# Patient Record
Sex: MALE | Race: WHITE | NOT HISPANIC OR LATINO | Employment: OTHER | URBAN - METROPOLITAN AREA
[De-identification: names, ages, dates, MRNs, and addresses within clinical notes are randomized per-mention and may not be internally consistent; named-entity substitution may affect disease eponyms.]

---

## 2017-04-04 ENCOUNTER — ALLSCRIPTS OFFICE VISIT (OUTPATIENT)
Dept: OTHER | Facility: OTHER | Age: 72
End: 2017-04-04

## 2017-04-25 PROCEDURE — 88305 TISSUE EXAM BY PATHOLOGIST: CPT | Performed by: SPECIALIST

## 2017-04-26 ENCOUNTER — LAB REQUISITION (OUTPATIENT)
Dept: LAB | Facility: HOSPITAL | Age: 72
End: 2017-04-26
Payer: MEDICARE

## 2017-04-26 DIAGNOSIS — D48.5 NEOPLASM OF UNCERTAIN BEHAVIOR OF SKIN: ICD-10-CM

## 2017-10-23 ENCOUNTER — GENERIC CONVERSION - ENCOUNTER (OUTPATIENT)
Dept: OTHER | Facility: OTHER | Age: 72
End: 2017-10-23

## 2018-01-12 VITALS
SYSTOLIC BLOOD PRESSURE: 152 MMHG | TEMPERATURE: 96.5 F | BODY MASS INDEX: 36.19 KG/M2 | WEIGHT: 282 LBS | OXYGEN SATURATION: 98 % | HEART RATE: 73 BPM | RESPIRATION RATE: 18 BRPM | HEIGHT: 74 IN | DIASTOLIC BLOOD PRESSURE: 90 MMHG

## 2018-04-02 RX ORDER — ESOMEPRAZOLE MAGNESIUM 40 MG/1
CAPSULE, DELAYED RELEASE ORAL
COMMUNITY
Start: 2012-03-26

## 2018-04-02 RX ORDER — MULTIVIT-MIN/IRON/FOLIC ACID/K 18-600-40
1 CAPSULE ORAL DAILY
COMMUNITY

## 2018-04-02 RX ORDER — MULTIVIT-MINERALS/FA/LYCOPENE 0.4 MG-6
TABLET ORAL DAILY
COMMUNITY
Start: 2012-03-27

## 2018-04-02 RX ORDER — CHOLECALCIFEROL (VITAMIN D3) 25 MCG
CAPSULE ORAL DAILY
COMMUNITY

## 2018-04-02 RX ORDER — TAMSULOSIN HYDROCHLORIDE 0.4 MG/1
CAPSULE ORAL
Refills: 11 | COMMUNITY
Start: 2018-01-31 | End: 2019-07-05 | Stop reason: HOSPADM

## 2018-04-03 ENCOUNTER — OFFICE VISIT (OUTPATIENT)
Dept: SURGICAL ONCOLOGY | Facility: CLINIC | Age: 73
End: 2018-04-03
Payer: MEDICARE

## 2018-04-03 VITALS
WEIGHT: 278 LBS | SYSTOLIC BLOOD PRESSURE: 152 MMHG | BODY MASS INDEX: 35.68 KG/M2 | DIASTOLIC BLOOD PRESSURE: 92 MMHG | HEIGHT: 74 IN | HEART RATE: 80 BPM | RESPIRATION RATE: 16 BRPM | TEMPERATURE: 97.8 F

## 2018-04-03 DIAGNOSIS — C43.72 MALIGNANT MELANOMA OF LEFT LOWER EXTREMITY (HCC): Primary | ICD-10-CM

## 2018-04-03 PROCEDURE — 99213 OFFICE O/P EST LOW 20 MIN: CPT | Performed by: SURGERY

## 2018-04-03 NOTE — PROGRESS NOTES
Surgical Oncology Follow Up       [address]  [facility] [address] [address] [facility]  1945  [id number]  [id number] [facility]  [facility] [address] [address]  [place] [address] [address]    Chief Complaint   Patient presents with    Results     f/u  annual visit, states notice some moles noted around post surgical incision area  Malignant melanoma of leg ([facility] [address] )    1/29/2008 Initial Diagnosis     Malignant melanoma of leg ([facility] [address] )         2/11/2008 Surgery     Wide excision of left knee with sentinel lymph node biopsy          Diagnosis and Staging: [id number] melanoma February 2008   Treatment History: Wide excision with sentinel lymph node biopsy February 2008   Current Therapy: Observation   Disease Status: JASON   Interval History: Patient returns in followup of his melanoma  He is doing well at this time with no complaints  He denies any abdominal pain, nausea, vomiting, back pain, bone pain, headaches, or cough  He continues to followup with his dermatologist            Review of Systems  Complete ROS Surg Onc:   Complete ROS Surg Onc:   Constitutional: The patient denies new or recent history of general fatigue, no recent weight loss, no change in appetite  Eyes: No complaints of visual problems, no scleral icterus  ENT: no complaints of ear pain, no hoarseness, no difficulty swallowing,  no tinnitus and no new masses in head, oral cavity, or neck  Cardiovascular: No complaints of chest pain, no palpitations, no ankle edema  Respiratory: No complaints of shortness of breath, no cough  Gastrointestinal: No complaints of jaundice, no bloody stools, no pale stools  Genitourinary: No complaints of dysuria, no hematuria, no nocturia, no frequent urination, no urethral discharge     Musculoskeletal: No complaints of weakness, paralysis, joint stiffness or arthralgias  Integumentary: No complaints of rash, no new lesions  Neurological: No complaints of convulsions, no seizures, no dizziness  Hematologic/Lymphatic: No complaints of easy bruising  Endocrine:  No hot or cold intolerance  No polydipsia, polyphagia, or polyuria  Allergy/immunology:  No environmental allergies  No food allergies  Not immunocompromised  Skin:  No pallor or rash  No wound  Patient Active Problem List   Diagnosis    Arthritis    Malignant melanoma of leg ([facility] [address] )     No past medical history on file  No past surgical history on file  No family history on file  Social History     Social History    Marital status: Single     Spouse name: N/A    Number of children: N/A    Years of education: N/A     Occupational History    Not on file  Social History Main Topics    Smoking status: Not on file    Smokeless tobacco: Not on file    Alcohol use Not on file    Drug use: Unknown    Sexual activity: Not on file     Other Topics Concern    Not on file     Social History Narrative    No narrative on file       Current Outpatient Prescriptions:     esomeprazole (NEXIUM) 40 MG capsule, Take by mouth, Disp: , Rfl:     Multiple Vitamins-Minerals (PX MENS MULTIVITAMINS) TABS, Take by mouth daily, Disp: , Rfl:     Ascorbic Acid (VITAMIN C) 500 MG CAPS, Take 1 tablet by mouth daily, Disp: , Rfl:     aspirin 81 MG tablet, Take 1 tablet by mouth daily, Disp: , Rfl:     Cholecalciferol (VITAMIN D-3) 1000 units CAPS, Take by mouth daily, Disp: , Rfl:     Milk Thistle 175 MG CAPS, Take by mouth, Disp: , Rfl:     tamsulosin (FLOMAX) 0 4 mg, TAKE 1 CAPSULE(S) EVERY DAY BY ORAL ROUTE , Disp: , Rfl: 11  No Known Allergies  Vitals:    04/03/18 0831   BP: 152/92   Pulse: 80   Resp: 16   Temp: 97 8 °F (36 6 °C)       Physical Exam  Constitutional: General appearance: The Patient is well-developed and well-nourished who appears the stated age in no acute distress   Patient is pleasant and talkative  HEENT:  Normocephalic  Sclerae are anicteric  Mucous membranes are moist  Neck is supple without adenopathy  No JVD  Chest: The lungs are clear to auscultation  Cardiac: Heart is regular rate  Abdomen: Abdomen is soft, non-tender, non-distended and without masses  Extremities: There is no clubbing or cyanosis  There is no edema  Symmetric  Neuro: Grossly nonfocal  Gait is normal      Lymphatic: No evidence of cervical adenopathy bilaterally  No evidence of axillary adenopathy bilaterally  No evidence of inguinal adenopathy bilaterally  Skin: Warm, anicteric    wide excision site has healed well  No evidence of local recurrence or in-transit disease  There are 3 moles near the superior aspect of his scar  These have been stable for 10 years  Psych:  Patient is pleasant and talkative  Pathology:      Labs:      Imaging  No results found  I reviewed the above laboratory and imaging data  Discussion/Summary:   75-year-old male status post wide excision and sentinel lymph node biopsy for a [id number] melanoma  Is clinically JASON at 10 years  He will continue to followup with his dermatologist   At this time, his risk of recurrence is low  I discussed he can just follow up with his dermatologist and CS as needed  I discussed if he has any new, persistent, or unexplained symptoms to contact us and directed imaging may be performed  He is agreeable to this  All his questions were answered

## 2018-08-30 ENCOUNTER — APPOINTMENT (OUTPATIENT)
Dept: RADIOLOGY | Facility: CLINIC | Age: 73
End: 2018-08-30
Payer: MEDICARE

## 2018-08-30 VITALS
HEIGHT: 74 IN | WEIGHT: 278.8 LBS | DIASTOLIC BLOOD PRESSURE: 64 MMHG | BODY MASS INDEX: 35.78 KG/M2 | HEART RATE: 70 BPM | SYSTOLIC BLOOD PRESSURE: 160 MMHG

## 2018-08-30 DIAGNOSIS — M17.0 BILATERAL PRIMARY OSTEOARTHRITIS OF KNEE: Primary | ICD-10-CM

## 2018-08-30 DIAGNOSIS — M25.561 RIGHT KNEE PAIN, UNSPECIFIED CHRONICITY: ICD-10-CM

## 2018-08-30 DIAGNOSIS — M79.661 RIGHT CALF PAIN: ICD-10-CM

## 2018-08-30 PROBLEM — C43.70: Status: ACTIVE | Noted: 2018-08-30

## 2018-08-30 PROCEDURE — 73562 X-RAY EXAM OF KNEE 3: CPT

## 2018-08-30 PROCEDURE — 99204 OFFICE O/P NEW MOD 45 MIN: CPT | Performed by: ORTHOPAEDIC SURGERY

## 2018-08-30 PROCEDURE — 20610 DRAIN/INJ JOINT/BURSA W/O US: CPT | Performed by: ORTHOPAEDIC SURGERY

## 2018-08-30 RX ORDER — TRIAMCINOLONE ACETONIDE 40 MG/ML
40 INJECTION, SUSPENSION INTRA-ARTICULAR; INTRAMUSCULAR
Status: COMPLETED | OUTPATIENT
Start: 2018-08-30 | End: 2018-08-30

## 2018-08-30 RX ORDER — BUPIVACAINE HYDROCHLORIDE 5 MG/ML
6 INJECTION, SOLUTION EPIDURAL; INTRACAUDAL
Status: COMPLETED | OUTPATIENT
Start: 2018-08-30 | End: 2018-08-30

## 2018-08-30 RX ADMIN — TRIAMCINOLONE ACETONIDE 40 MG: 40 INJECTION, SUSPENSION INTRA-ARTICULAR; INTRAMUSCULAR at 09:09

## 2018-08-30 RX ADMIN — BUPIVACAINE HYDROCHLORIDE 6 ML: 5 INJECTION, SOLUTION EPIDURAL; INTRACAUDAL at 09:09

## 2018-08-30 NOTE — PROGRESS NOTES
Assessment/Plan:  1  Bilateral primary osteoarthritis of knee  Large joint arthrocentesis   2  Right calf pain     3  Right knee pain, unspecified chronicity  XR knee 3 vw right non injury    Large joint arthrocentesis     Jaja Fuentes is a pleasant 74year old gentleman with activity related right knee and calf pain due to his severe underlying osteoarthritis, primarily of the patellofemoral compartment  He had two recent negative ultrasounds, so there is no concern for DVT  His calf pain is likely from overuse or limping due to his knee osteoarthritis  Since he has done well after cortisone injections 2 and 4 years ago, respectively, he consented to a repeat injection today, which he tolerated well without any complication  He does understand that he may need a total knee arthroplasty at some point, but would like to delay this as long as possible as he is still working  He can return in 3-4 months pending the efficacy of today's injection  All of his questions were addressed  Large joint arthrocentesis  Date/Time: 8/30/2018 9:09 AM  Consent given by: patient  Site marked: site marked  Timeout: Immediately prior to procedure a time out was called to verify the correct patient, procedure, equipment, support staff and site/side marked as required   Supporting Documentation  Indications: pain   Procedure Details  Location: knee - R knee  Preparation: Patient was prepped and draped in the usual sterile fashion  Needle size: 20 G  Ultrasound guidance: no  Approach: anterolateral  Medications administered: 6 mL bupivacaine (PF) 0 5 %; 40 mg triamcinolone acetonide 40 mg/mL    Patient tolerance: patient tolerated the procedure well with no immediate complications  Dressing:  Sterile dressing applied      After the risks and benefits of the right knee injection were explained, and verbal consent was obtained for the injection    The right knee was prepped using aseptic technique using isopropyl alcohol and betadine solution  The right knee was successfully injected with 6cc of 0 5% marcaine without epinephrine and 2cc of Kenalog 40 suspension using a 20 gauge needle  After the injection, hemostasis was achieved, and a dressing was applied  Post-injection icing and activity modification instructions were provided  The patient tolerated the procedure well  Subjective: Right knee and calf pain    Patient ID: Katerina Agarwal is a 74 y o  male  Orparag Huerta is a pleasant 74year old gentleman presenting for evaluation of right knee pain that has been intermittent for years  He reports a history of injury in the service 50 years ago in which he was casted and received physical therapy  He has not had any surgery on the knee  He developed discomfort with ADLs approximately 4 years ago, and was seen by an orthopedist in [place]  He was given a cortisone injection and did well for 2 years  He then underwent another injection 2 years ago, which helped significantly  He denies any locking, buckling, or giving way  He has not tried NSAIDs, tylenol, a brace, or assistive devices to help with his current pain  At it's worst, it rates 10/10  He has had sharp, stabbing calf pain over the past 3 weeks as well, but US ordered by his PCP have twice ruled out a DVT  His knee pain is primarily in the anterior and medial aspect  He denies parasthesias in the lower extremities  Review of Systems   Constitutional: Negative  HENT: Negative  Eyes: Negative  Respiratory: Negative  Cardiovascular: Negative  Gastrointestinal: Negative  Endocrine: Positive for polyuria  Genitourinary: Negative  Musculoskeletal: Positive for arthralgias and myalgias  Skin: Negative  Allergic/Immunologic: Negative  Neurological: Negative  Hematological: Negative  Psychiatric/Behavioral: Negative  Past Medical History:   Diagnosis Date    Cancer [facility])     Osteoarthritis        History reviewed   No pertinent surgical history  Family History   Problem Relation Age of Onset    No Known Problems Mother     No Known Problems Father     No Known Problems Sister     No Known Problems Brother     No Known Problems Maternal Aunt     No Known Problems Maternal Uncle     No Known Problems Paternal Aunt     No Known Problems Paternal Uncle     No Known Problems Maternal Grandmother     No Known Problems Maternal Grandfather     No Known Problems Paternal Grandmother     No Known Problems Paternal Grandfather     ADD / ADHD Neg Hx     Anesthesia problems Neg Hx     Cancer Neg Hx     Clotting disorder Neg Hx     Collagen disease Neg Hx     Diabetes Neg Hx     Dislocations Neg Hx     Learning disabilities Neg Hx     Neurological problems Neg Hx     Osteoporosis Neg Hx     Rheumatologic disease Neg Hx     Scoliosis Neg Hx     Vascular Disease Neg Hx        Social History     Occupational History    Not on file  Social History Main Topics    Smoking status: Never Smoker    Smokeless tobacco: Never Used    Alcohol use 1 2 oz/week     2 Glasses of wine per week      Comment: occasionally     Drug use: No    Sexual activity: Not on file         Current Outpatient Prescriptions:     Ascorbic Acid (VITAMIN C) 500 MG CAPS, Take 1 tablet by mouth daily, Disp: , Rfl:     aspirin 81 MG tablet, Take 1 tablet by mouth daily, Disp: , Rfl:     Cholecalciferol (VITAMIN D-3) 1000 units CAPS, Take by mouth daily, Disp: , Rfl:     esomeprazole (NEXIUM) 40 MG capsule, Take by mouth, Disp: , Rfl:     Milk Thistle 175 MG CAPS, Take by mouth, Disp: , Rfl:     Multiple Vitamins-Minerals (PX MENS MULTIVITAMINS) TABS, Take by mouth daily, Disp: , Rfl:     tamsulosin (FLOMAX) 0 4 mg, TAKE 1 CAPSULE(S) EVERY DAY BY ORAL ROUTE , Disp: , Rfl: 11    No Known Allergies    Objective:  Vitals:    08/30/18 0823   BP: 160/64   Pulse: 70       Body mass index is 35 8 kg/m²      Right Knee Exam     Tenderness   The patient is experiencing no tenderness  Range of Motion   Extension: 5   Flexion: 120     Muscle Strength     The patient has normal right knee strength  Tests   Terra:  Medial - negative Lateral - negative  Lachman:  Anterior - negative      Drawer:       Anterior - negative      Varus: negative  Valgus: negative  Patellar Apprehension: negative    Other   Erythema: absent  Scars: absent  Sensation: normal  Pulse: present  Swelling: mild  Other tests: no effusion present    Comments:  Crepitus with PROM  Negative PFG  Negative Armando  No significant tenderness with calf compression  Ambulates slowly with slightly antalgic gait on the right  Normal sensation L2-S1            Observations     Right Knee   Negative for effusion  Physical Exam   Constitutional: He is oriented to person, place, and time  He appears well-developed and well-nourished  Body mass index is 35 8 kg/m²  HENT:   Head: Normocephalic and atraumatic  Eyes: EOM are normal    Bruise around right eye   Neck: Normal range of motion  Cardiovascular: Intact distal pulses  Pulmonary/Chest: Effort normal    Musculoskeletal:        Right knee: He exhibits no effusion  See ortho exam   Neurological: He is alert and oriented to person, place, and time  Skin: Skin is warm and dry  Psychiatric: He has a normal mood and affect  His behavior is normal  Judgment and thought content normal        I have personally reviewed pertinent films in PACS of his weight bearing knees which show tricompartmental degenerative changes, most significant in the patellofemoral compartment with endstage bone on bone appearance  There is narrowing of the medial and lateral compartments with small osteophytes  No acute fracture, dislocation, or loose body

## 2019-07-05 ENCOUNTER — OFFICE VISIT (OUTPATIENT)
Dept: OBGYN CLINIC | Facility: CLINIC | Age: 74
End: 2019-07-05
Payer: MEDICARE

## 2019-07-05 VITALS
SYSTOLIC BLOOD PRESSURE: 149 MMHG | HEART RATE: 79 BPM | DIASTOLIC BLOOD PRESSURE: 84 MMHG | WEIGHT: 271.6 LBS | HEIGHT: 74 IN | BODY MASS INDEX: 34.86 KG/M2

## 2019-07-05 DIAGNOSIS — M17.0 BILATERAL PRIMARY OSTEOARTHRITIS OF KNEE: Primary | ICD-10-CM

## 2019-07-05 DIAGNOSIS — G89.29 CHRONIC PAIN OF RIGHT KNEE: ICD-10-CM

## 2019-07-05 DIAGNOSIS — M25.561 CHRONIC PAIN OF RIGHT KNEE: ICD-10-CM

## 2019-07-05 PROCEDURE — 20610 DRAIN/INJ JOINT/BURSA W/O US: CPT | Performed by: ORTHOPAEDIC SURGERY

## 2019-07-05 PROCEDURE — 99213 OFFICE O/P EST LOW 20 MIN: CPT | Performed by: ORTHOPAEDIC SURGERY

## 2019-07-05 RX ORDER — TRIAMCINOLONE ACETONIDE 40 MG/ML
80 INJECTION, SUSPENSION INTRA-ARTICULAR; INTRAMUSCULAR
Status: COMPLETED | OUTPATIENT
Start: 2019-07-05 | End: 2019-07-05

## 2019-07-05 RX ORDER — BUPIVACAINE HYDROCHLORIDE 5 MG/ML
6 INJECTION, SOLUTION EPIDURAL; INTRACAUDAL
Status: COMPLETED | OUTPATIENT
Start: 2019-07-05 | End: 2019-07-05

## 2019-07-05 RX ADMIN — BUPIVACAINE HYDROCHLORIDE 6 ML: 5 INJECTION, SOLUTION EPIDURAL; INTRACAUDAL at 14:27

## 2019-07-05 RX ADMIN — TRIAMCINOLONE ACETONIDE 80 MG: 40 INJECTION, SUSPENSION INTRA-ARTICULAR; INTRAMUSCULAR at 14:27

## 2019-07-05 NOTE — PROGRESS NOTES
Assessment/Plan:  1  Bilateral primary osteoarthritis of knee  Large joint arthrocentesis: R knee   2  Chronic pain of right knee  Large joint arthrocentesis: R knee     Ashley Lin is a pleasant 38-year-old gentleman presenting today for follow-up of his activity related right knee pain due to his significant underlying osteoarthritis  He has been doing well with periodic cortisone injections, his most recent one 10 months ago  He is not yet ready to pursue total knee arthroplasty  His current pain complaints are consistent with a flare of his osteoarthritis  He consented to and underwent a right knee cortisone injection as detailed below without difficulty or complication  Post injection instructions were provided  We will plan to see him back in 3-4 months or as needed pending the efficacy of today's injection  All of his questions were addressed    Large joint arthrocentesis: R knee  Date/Time: 7/5/2019 2:27 PM  Consent given by: patient  Site marked: site marked  Timeout: Immediately prior to procedure a time out was called to verify the correct patient, procedure, equipment, support staff and site/side marked as required   Supporting Documentation  Indications: pain and joint swelling   Procedure Details  Location: knee - R knee  Preparation: Patient was prepped and draped in the usual sterile fashion  Needle size: 18 G  Ultrasound guidance: no  Approach: lateral  Medications administered: 6 mL bupivacaine (PF) 0 5 %; 80 mg triamcinolone acetonide 40 mg/mL    Aspirate amount: 0 mL    Patient tolerance: patient tolerated the procedure well with no immediate complications  Dressing:  Sterile dressing applied          Subjective: Right knee follow up    Patient ID: Marsh Koyanagi is a 66 y o  male  Ashley Lin is a pleasant 66year old gentleman presenting today for follow-up of his activity related right knee pain with known severe underlying osteoarthritis    He underwent a cortisone injection approximately 10 months ago and did well up until this morning  He walks approximately 2 miles for [employer] last night, and woke up in the middle the night with significant medial-sided right knee pain  He denies any injury last evening  He denies any mechanical symptoms today  He is interested in repeat cortisone injection if possible  Review of Systems   Constitutional: Positive for activity change  HENT: Negative  Eyes: Negative  Respiratory: Negative  Cardiovascular: Negative  Gastrointestinal: Negative  Endocrine: Positive for polyuria  Genitourinary: Negative  Musculoskeletal: Positive for arthralgias  Skin: Negative  Allergic/Immunologic: Negative  Neurological: Negative  Hematological: Negative  Psychiatric/Behavioral: Negative  Past Medical History:   Diagnosis Date    Cancer [facility])     Osteoarthritis        History reviewed  No pertinent surgical history      Family History   Problem Relation Age of Onset    No Known Problems Mother     No Known Problems Father     No Known Problems Sister     No Known Problems Brother     No Known Problems Maternal Aunt     No Known Problems Maternal Uncle     No Known Problems Paternal Aunt     No Known Problems Paternal Uncle     No Known Problems Maternal Grandmother     No Known Problems Maternal Grandfather     No Known Problems Paternal Grandmother     No Known Problems Paternal Grandfather     ADD / ADHD Neg Hx     Anesthesia problems Neg Hx     Cancer Neg Hx     Clotting disorder Neg Hx     Collagen disease Neg Hx     Diabetes Neg Hx     Dislocations Neg Hx     Learning disabilities Neg Hx     Neurological problems Neg Hx     Osteoporosis Neg Hx     Rheumatologic disease Neg Hx     Scoliosis Neg Hx     Vascular Disease Neg Hx        Social History     Occupational History    Not on file   Tobacco Use    Smoking status: Never Smoker    Smokeless tobacco: Never Used   Substance and Sexual Activity    Alcohol use: Yes     Alcohol/week: 2 0 standard drinks     Types: 2 Glasses of wine per week     Comment: occasionally     Drug use: No    Sexual activity: Not on file         Current Outpatient Medications:     Ascorbic Acid (VITAMIN C) 500 MG CAPS, Take 1 tablet by mouth daily, Disp: , Rfl:     Cholecalciferol (VITAMIN D-3) 1000 units CAPS, Take by mouth daily, Disp: , Rfl:     esomeprazole (NEXIUM) 40 MG capsule, Take by mouth, Disp: , Rfl:     Milk Thistle 175 MG CAPS, Take by mouth, Disp: , Rfl:     Multiple Vitamins-Minerals (PX MENS MULTIVITAMINS) TABS, Take by mouth daily, Disp: , Rfl:     aspirin 81 MG tablet, Take 1 tablet by mouth daily, Disp: , Rfl:     No Known Allergies    Objective:  Vitals:    07/05/19 1412   BP: 149/84   Pulse: 79       Body mass index is 34 87 kg/m²  Right Knee Exam     Muscle Strength   The patient has normal right knee strength  Tenderness   The patient is experiencing tenderness in the medial retinaculum, MCL and medial joint line  Range of Motion   Extension:  10 abnormal   Flexion:  120 normal     Tests   Terra:  Medial - negative Lateral - negative  Varus: negative Valgus: negative  Drawer:  Anterior - negative    Posterior - negative  Patellar apprehension: negative    Other   Erythema: absent  Scars: absent  Sensation: normal  Pulse: present  Swelling: mild  Effusion: no effusion present    Comments:  Crepitus with PROM  Negative PFG  Negative Armando  No significant tenderness with calf compression  Ambulates slowly with slightly antalgic gait on the right  Normal sensation L2-S1            Observations     Right Knee   Negative for effusion  Physical Exam   Constitutional: He is oriented to person, place, and time  He appears well-developed and well-nourished  Body mass index is 34 87 kg/m²  HENT:   Head: Normocephalic and atraumatic  Eyes: EOM are normal    Neck: Normal range of motion  Cardiovascular: Intact distal pulses  Pulmonary/Chest: Effort normal    Musculoskeletal:        Right knee: He exhibits no effusion  See ortho exam   Neurological: He is alert and oriented to person, place, and time  Skin: Skin is warm and dry  Psychiatric: He has a normal mood and affect  His behavior is normal  Judgment and thought content normal    Nursing note and vitals reviewed

## 2021-02-13 ENCOUNTER — IMMUNIZATIONS (OUTPATIENT)
Dept: FAMILY MEDICINE CLINIC | Facility: HOSPITAL | Age: 76
End: 2021-02-13

## 2021-02-13 DIAGNOSIS — Z23 ENCOUNTER FOR IMMUNIZATION: Primary | ICD-10-CM

## 2021-02-13 PROCEDURE — 91300 SARS-COV-2 / COVID-19 MRNA VACCINE (PFIZER-BIONTECH) 30 MCG: CPT

## 2021-02-13 PROCEDURE — 0001A SARS-COV-2 / COVID-19 MRNA VACCINE (PFIZER-BIONTECH) 30 MCG: CPT

## 2021-03-05 ENCOUNTER — IMMUNIZATIONS (OUTPATIENT)
Dept: FAMILY MEDICINE CLINIC | Facility: HOSPITAL | Age: 76
End: 2021-03-05

## 2021-03-05 DIAGNOSIS — Z23 ENCOUNTER FOR IMMUNIZATION: Primary | ICD-10-CM

## 2021-03-05 PROCEDURE — 0002A SARS-COV-2 / COVID-19 MRNA VACCINE (PFIZER-BIONTECH) 30 MCG: CPT

## 2021-03-05 PROCEDURE — 91300 SARS-COV-2 / COVID-19 MRNA VACCINE (PFIZER-BIONTECH) 30 MCG: CPT

## 2023-12-07 ENCOUNTER — HOSPITAL ENCOUNTER (EMERGENCY)
Facility: HOSPITAL | Age: 78
Discharge: HOME | End: 2023-12-07
Attending: EMERGENCY MEDICINE
Payer: MEDICARE

## 2023-12-07 ENCOUNTER — APPOINTMENT (EMERGENCY)
Dept: CT IMAGING | Facility: HOSPITAL | Age: 78
End: 2023-12-07
Payer: MEDICARE

## 2023-12-07 VITALS
TEMPERATURE: 98.8 F | HEART RATE: 67 BPM | OXYGEN SATURATION: 98 % | WEIGHT: 270.73 LBS | BODY MASS INDEX: 34.76 KG/M2 | RESPIRATION RATE: 18 BRPM | DIASTOLIC BLOOD PRESSURE: 74 MMHG | SYSTOLIC BLOOD PRESSURE: 163 MMHG

## 2023-12-07 DIAGNOSIS — R10.9 ABDOMINAL PAIN: Primary | ICD-10-CM

## 2023-12-07 LAB
ALBUMIN SERPL BCP-MCNC: 4.4 G/DL (ref 3.5–5)
ALP SERPL-CCNC: 52 U/L (ref 34–104)
ALT SERPL W P-5'-P-CCNC: 36 U/L (ref 7–52)
ANION GAP SERPL CALCULATED.3IONS-SCNC: 9 MMOL/L
AST SERPL W P-5'-P-CCNC: 35 U/L (ref 13–39)
BASOPHILS # BLD AUTO: 0.06 THOUSANDS/ÂΜL (ref 0–0.1)
BASOPHILS NFR BLD AUTO: 1 % (ref 0–1)
BILIRUB SERPL-MCNC: 0.51 MG/DL (ref 0.2–1)
BILIRUB UR QL STRIP: NEGATIVE
BUN SERPL-MCNC: 16 MG/DL (ref 5–25)
CALCIUM SERPL-MCNC: 9.1 MG/DL (ref 8.4–10.2)
CHLORIDE SERPL-SCNC: 107 MMOL/L (ref 96–108)
CLARITY UR: CLEAR
CO2 SERPL-SCNC: 22 MMOL/L (ref 21–32)
COLOR UR: NORMAL
CREAT SERPL-MCNC: 0.84 MG/DL (ref 0.6–1.3)
EOSINOPHIL # BLD AUTO: 0.41 THOUSAND/ÂΜL (ref 0–0.61)
EOSINOPHIL NFR BLD AUTO: 5 % (ref 0–6)
ERYTHROCYTE [DISTWIDTH] IN BLOOD BY AUTOMATED COUNT: 14.3 % (ref 11.6–15.1)
GFR SERPL CREATININE-BSD FRML MDRD: 82 ML/MIN/1.73SQ M
GLUCOSE SERPL-MCNC: 100 MG/DL (ref 65–140)
GLUCOSE UR STRIP-MCNC: NEGATIVE MG/DL
HCT VFR BLD AUTO: 45.6 % (ref 36.5–49.3)
HGB BLD-MCNC: 14.7 G/DL (ref 12–17)
HGB UR QL STRIP.AUTO: NEGATIVE
IMM GRANULOCYTES # BLD AUTO: 0.02 THOUSAND/UL (ref 0–0.2)
IMM GRANULOCYTES NFR BLD AUTO: 0 % (ref 0–2)
KETONES UR STRIP-MCNC: NEGATIVE MG/DL
LEUKOCYTE ESTERASE UR QL STRIP: NEGATIVE
LIPASE SERPL-CCNC: 10 U/L (ref 11–82)
LYMPHOCYTES # BLD AUTO: 2.37 THOUSANDS/ÂΜL (ref 0.6–4.47)
LYMPHOCYTES NFR BLD AUTO: 26 % (ref 14–44)
MCH RBC QN AUTO: 27.1 PG (ref 26.8–34.3)
MCHC RBC AUTO-ENTMCNC: 32.2 G/DL (ref 31.4–37.4)
MCV RBC AUTO: 84 FL (ref 82–98)
MONOCYTES # BLD AUTO: 0.89 THOUSAND/ÂΜL (ref 0.17–1.22)
MONOCYTES NFR BLD AUTO: 10 % (ref 4–12)
NEUTROPHILS # BLD AUTO: 5.22 THOUSANDS/ÂΜL (ref 1.85–7.62)
NEUTS SEG NFR BLD AUTO: 58 % (ref 43–75)
NITRITE UR QL STRIP: NEGATIVE
NRBC BLD AUTO-RTO: 0 /100 WBCS
PH UR STRIP.AUTO: 5 [PH]
PLATELET # BLD AUTO: 192 THOUSANDS/UL (ref 149–390)
PMV BLD AUTO: 10.6 FL (ref 8.9–12.7)
POTASSIUM SERPL-SCNC: 4.3 MMOL/L (ref 3.5–5.3)
PROT SERPL-MCNC: 7.6 G/DL (ref 6.4–8.4)
PROT UR STRIP-MCNC: NEGATIVE MG/DL
RBC # BLD AUTO: 5.43 MILLION/UL (ref 3.88–5.62)
SODIUM SERPL-SCNC: 138 MMOL/L (ref 135–147)
SP GR UR STRIP.AUTO: 1.02 (ref 1–1.03)
UROBILINOGEN UR STRIP-ACNC: <2 MG/DL
WBC # BLD AUTO: 8.97 THOUSAND/UL (ref 4.31–10.16)

## 2023-12-07 PROCEDURE — 74177 CT ABD & PELVIS W/CONTRAST: CPT

## 2023-12-07 PROCEDURE — 85025 COMPLETE CBC W/AUTO DIFF WBC: CPT | Performed by: EMERGENCY MEDICINE

## 2023-12-07 PROCEDURE — 99285 EMERGENCY DEPT VISIT HI MDM: CPT | Performed by: EMERGENCY MEDICINE

## 2023-12-07 PROCEDURE — G1004 CDSM NDSC: HCPCS

## 2023-12-07 PROCEDURE — 36415 COLL VENOUS BLD VENIPUNCTURE: CPT | Performed by: EMERGENCY MEDICINE

## 2023-12-07 PROCEDURE — 99284 EMERGENCY DEPT VISIT MOD MDM: CPT

## 2023-12-07 PROCEDURE — 80053 COMPREHEN METABOLIC PANEL: CPT | Performed by: EMERGENCY MEDICINE

## 2023-12-07 PROCEDURE — 81003 URINALYSIS AUTO W/O SCOPE: CPT | Performed by: EMERGENCY MEDICINE

## 2023-12-07 PROCEDURE — 83690 ASSAY OF LIPASE: CPT | Performed by: EMERGENCY MEDICINE

## 2023-12-07 PROCEDURE — 96374 THER/PROPH/DIAG INJ IV PUSH: CPT

## 2023-12-07 RX ORDER — ONDANSETRON 2 MG/ML
4 INJECTION INTRAMUSCULAR; INTRAVENOUS ONCE
Status: DISCONTINUED | OUTPATIENT
Start: 2023-12-07 | End: 2023-12-07 | Stop reason: HOSPADM

## 2023-12-07 RX ORDER — KETOROLAC TROMETHAMINE 30 MG/ML
15 INJECTION, SOLUTION INTRAMUSCULAR; INTRAVENOUS ONCE
Status: COMPLETED | OUTPATIENT
Start: 2023-12-07 | End: 2023-12-07

## 2023-12-07 RX ADMIN — IOHEXOL 100 ML: 350 INJECTION, SOLUTION INTRAVENOUS at 13:06

## 2023-12-07 RX ADMIN — KETOROLAC TROMETHAMINE 15 MG: 30 INJECTION, SOLUTION INTRAMUSCULAR; INTRAVENOUS at 14:06

## 2023-12-07 NOTE — ED PROVIDER NOTES
History  Chief Complaint   Patient presents with    Abdominal Pain     Patient reports RLQ pain that started a few months ago. Denies N/V/D     49-year-old male comes in for evaluation of right lower quadrant abdominal pain. Patient states it has been going on for approximately 3 to 4 months. Notices it more when he leans forward or when he is driving. Patient denies any nausea vomiting or diarrhea. No fevers. Denies any hematuria. No back pain. History provided by:  Patient   used: No    Abdominal Pain  Pain location:  RLQ  Pain quality: gnawing    Pain radiates to:  Does not radiate  Pain severity:  Moderate  Onset quality:  Gradual  Duration:  12 weeks  Timing:  Constant  Progression:  Waxing and waning  Chronicity:  New  Context: not alcohol use, not previous surgeries, not suspicious food intake and not trauma    Ineffective treatments:  None tried  Associated symptoms: no anorexia, no chest pain, no cough, no diarrhea, no fever, no hematuria and no vomiting    Risk factors: being elderly and obesity    Risk factors: no alcohol abuse, no NSAID use and not pregnant        Prior to Admission Medications   Prescriptions Last Dose Informant Patient Reported? Taking? Ascorbic Acid (VITAMIN C) 500 MG CAPS   Yes No   Sig: Take 1 tablet by mouth daily   Cholecalciferol (VITAMIN D-3) 1000 units CAPS   Yes No   Sig: Take by mouth daily   Milk Thistle 175 MG CAPS   Yes No   Sig: Take by mouth   Multiple Vitamins-Minerals (PX MENS MULTIVITAMINS) TABS   Yes No   Sig: Take by mouth daily   aspirin 81 MG tablet   Yes No   Sig: Take 1 tablet by mouth daily   esomeprazole (NEXIUM) 40 MG capsule   Yes No   Sig: Take by mouth      Facility-Administered Medications: None       Past Medical History:   Diagnosis Date    Cancer ([address])     Osteoarthritis        History reviewed. No pertinent surgical history.     Family History   Problem Relation Age of Onset    No Known Problems Mother     No Known Problems Father     No Known Problems Sister     No Known Problems Brother     No Known Problems Maternal Aunt     No Known Problems Maternal Uncle     No Known Problems Paternal Aunt     No Known Problems Paternal Uncle     No Known Problems Maternal Grandmother     No Known Problems Maternal Grandfather     No Known Problems Paternal Grandmother     No Known Problems Paternal Grandfather     ADD / ADHD Neg Hx     Anesthesia problems Neg Hx     Cancer Neg Hx     Clotting disorder Neg Hx     Collagen disease Neg Hx     Diabetes Neg Hx     Dislocations Neg Hx     Learning disabilities Neg Hx     Neurological problems Neg Hx     Osteoporosis Neg Hx     Rheumatologic disease Neg Hx     Scoliosis Neg Hx     Vascular Disease Neg Hx      I have reviewed and agree with the history as documented. E-Cigarette/Vaping     E-Cigarette/Vaping Substances     Social History     Tobacco Use    Smoking status: Never    Smokeless tobacco: Never   Substance Use Topics    Alcohol use: Yes     Alcohol/week: 2.0 standard drinks of alcohol     Types: 2 Glasses of wine per week     Comment: occasionally     Drug use: No       Review of Systems   Constitutional:  Negative for activity change, appetite change and fever. HENT:  Negative for congestion and facial swelling. Eyes:  Negative for discharge and redness. Respiratory:  Negative for cough and wheezing. Cardiovascular:  Negative for chest pain and leg swelling. Gastrointestinal:  Positive for abdominal pain. Negative for abdominal distention, anorexia, blood in stool, diarrhea and vomiting. Endocrine: Negative for cold intolerance and polydipsia. Genitourinary:  Negative for difficulty urinating and hematuria. Musculoskeletal:  Negative for arthralgias and gait problem. Skin:  Negative for color change and rash. Allergic/Immunologic: Negative for food allergies and immunocompromised state. Neurological:  Negative for dizziness, seizures and headaches. Hematological:  Negative for adenopathy. Does not bruise/bleed easily. Psychiatric/Behavioral:  Negative for agitation, confusion and decreased concentration. All other systems reviewed and are negative. Physical Exam  Physical Exam  Vitals and nursing note reviewed. Constitutional:       Appearance: He is well-developed. He is not toxic-appearing. HENT:      Head: Normocephalic and atraumatic. Right Ear: Tympanic membrane normal.      Left Ear: Tympanic membrane normal.      Nose: Nose normal.   Eyes:      General: Lids are normal.      Conjunctiva/sclera: Conjunctivae normal.      Pupils: Pupils are equal, round, and reactive to light. Neck:      Vascular: No carotid bruit or JVD. Trachea: Trachea normal.   Cardiovascular:      Rate and Rhythm: Normal rate and regular rhythm. No extrasystoles are present. Heart sounds: Normal heart sounds. Pulmonary:      Effort: Pulmonary effort is normal.      Breath sounds: No decreased breath sounds, wheezing, rhonchi or rales. Chest:      Chest wall: No deformity or tenderness. Abdominal:      General: Bowel sounds are normal.      Palpations: Abdomen is soft. Tenderness: There is abdominal tenderness in the right lower quadrant. There is no guarding or rebound. Musculoskeletal:      Right shoulder: No swelling, deformity or tenderness. Normal range of motion. Cervical back: Normal range of motion and neck supple. No deformity, tenderness or bony tenderness. Lymphadenopathy:      Cervical: No cervical adenopathy. Skin:     General: Skin is warm and dry. Neurological:      Mental Status: He is alert and oriented to person, place, and time. Cranial Nerves: No cranial nerve deficit. Sensory: No sensory deficit. Deep Tendon Reflexes: Reflexes are normal and symmetric. Psychiatric:         Speech: Speech normal.         Behavior: Behavior normal.         Thought Content:  Thought content normal. Judgment: Judgment normal.         Vital Signs  ED Triage Vitals   Temperature Pulse Respirations Blood Pressure SpO2   12/07/23 1118 12/07/23 1118 12/07/23 1118 12/07/23 1118 12/07/23 1118   97.8 °F (36.6 °C) 81 18 (!) 197/94 97 %      Temp Source Heart Rate Source Patient Position - Orthostatic VS BP Location FiO2 (%)   12/07/23 1118 12/07/23 1118 12/07/23 1126 12/07/23 1118 --   Oral Monitor Sitting Right arm       Pain Score       12/07/23 1118       6           Vitals:    12/07/23 1118 12/07/23 1126 12/07/23 1329   BP: (!) 197/94 (!) 187/85 163/74   Pulse: 81 78 67   Patient Position - Orthostatic VS:  Sitting Sitting         Visual Acuity      ED Medications  Medications   ondansetron (ZOFRAN) injection 4 mg (4 mg Intravenous Not Given 12/7/23 1406)   iohexol (OMNIPAQUE) 350 MG/ML injection (MULTI-DOSE) 100 mL (100 mL Intravenous Given 12/7/23 1306)   ketorolac (TORADOL) injection 15 mg (15 mg Intravenous Given 12/7/23 1406)       Diagnostic Studies  Results Reviewed       Procedure Component Value Units Date/Time    Comprehensive metabolic panel [[id number]] Collected: 12/07/23 1151    Lab Status: Final result Specimen: Blood from Arm, Left Updated: 12/07/23 1238     Sodium 138 mmol/L      Potassium 4.3 mmol/L      Chloride 107 mmol/L      CO2 22 mmol/L      ANION GAP 9 mmol/L      BUN 16 mg/dL      Creatinine 0.84 mg/dL      Glucose 100 mg/dL      Calcium 9.1 mg/dL      AST 35 U/L      ALT 36 U/L      Alkaline Phosphatase 52 U/L      Total Protein 7.6 g/dL      Albumin 4.4 g/dL      Total Bilirubin 0.51 mg/dL      eGFR 82 ml/min/1.73sq m     Narrative:      [place] guidelines for Chronic Kidney Disease (CKD):     Stage 1 with normal or high GFR (GFR > 90 mL/min/1.73 square meters)    Stage 2 Mild CKD (GFR = 60-89 mL/min/1.73 square meters)    Stage 3A Moderate CKD (GFR = 45-59 mL/min/1.73 square meters)    Stage 3B Moderate CKD (GFR = 30-44 mL/min/1.73 square meters)    Stage 4 Severe CKD (GFR = 15-29 mL/min/1.73 square meters)    Stage 5 End Stage CKD (GFR <15 mL/min/1.73 square meters)  Note: GFR calculation is accurate only with a steady state creatinine    Lipase [[id number]]  (Abnormal) Collected: 12/07/23 1151    Lab Status: Final result Specimen: Blood from Arm, Left Updated: 12/07/23 1238     Lipase 10 u/L     UA w Reflex to Microscopic w Reflex to Culture [[id number]] Collected: 12/07/23 1152    Lab Status: Final result Specimen: Urine, Clean Catch Updated: 12/07/23 1210     Color, UA Light Yellow     Clarity, UA Clear     Specific Gravity, UA 1.018     pH, UA 5.0     Leukocytes, UA Negative     Nitrite, UA Negative     Protein, UA Negative mg/dl      Glucose, UA Negative mg/dl      Ketones, UA Negative mg/dl      Urobilinogen, UA <2.0 mg/dl      Bilirubin, UA Negative     Occult Blood, UA Negative    CBC and differential [[id number]] Collected: 12/07/23 1151    Lab Status: Final result Specimen: Blood from Arm, Left Updated: 12/07/23 1208     WBC 8.97 Thousand/uL      RBC 5.43 Million/uL      Hemoglobin 14.7 g/dL      Hematocrit 45.6 %      MCV 84 fL      MCH 27.1 pg      MCHC 32.2 g/dL      RDW 14.3 %      MPV 10.6 fL      Platelets 853 Thousands/uL      nRBC 0 /100 WBCs      Neutrophils Relative 58 %      Immat GRANS % 0 %      Lymphocytes Relative 26 %      Monocytes Relative 10 %      Eosinophils Relative 5 %      Basophils Relative 1 %      Neutrophils Absolute 5.22 Thousands/µL      Immature Grans Absolute 0.02 Thousand/uL      Lymphocytes Absolute 2.37 Thousands/µL      Monocytes Absolute 0.89 Thousand/µL      Eosinophils Absolute 0.41 Thousand/µL      Basophils Absolute 0.06 Thousands/µL                    CT abdomen pelvis with contrast   Final Result by Mitchel Bourgeois MD (12/07 1407)   1. No acute findings with a normal appendix identified. 2. Punctate right lower renal pole nonobstructive calculus without additional urinary calculi.             Workstation performed: [id number]                    Procedures  Procedures         ED Course                                             Medical Decision Making  Differential diagnosis includes but is not limited to acute appendicitis, colitis, gastroenteritis, diverticulosis, diverticulitis, musculoskeletal pain,    Amount and/or Complexity of Data Reviewed  External Data Reviewed: radiology and notes. Labs: ordered. Radiology: ordered. Risk  OTC drugs. Prescription drug management. Risk Details: Patient should continue all his current medications. Patient can take Motrin or Tylenol as needed for pain. Patient should follow-up with PCP and GI outpatient             Disposition  Final diagnoses:   Abdominal pain     Time reflects when diagnosis was documented in both MDM as applicable and the Disposition within this note       Time User Action Codes Description Comment    12/7/2023  2:10 PM Willy Fee Add [R10.9] Abdominal pain           ED Disposition       ED Disposition   Discharge    Condition   Stable    Date/Time   Thu Dec 7, 2023  2:10 PM    Comment   Michaela Roberts [facility] discharge to home/self care.                    Follow-up Information       Follow up With Specialties Details Why Contact Info Additional Information    [facility] Internal Medicine Schedule an appointment as soon as possible for a visit   [address] [facility] [place] [address] [facility],Fourth Floor Internal Medicine [facility]), [address] [place], [place], [address] [facility] Gastro[facility]ology Schedule an appointment as soon as possible for a visit   [address] [facility] [address]  [address] [place] [address]  [facility]), [address] [facility], [address] [place], [place], [address] [phone]            Discharge Medication List as of 12/7/2023  2:12 PM        CONTINUE these medications which have NOT CHANGED    Details   Ascorbic Acid (VITAMIN C) 500 MG CAPS Take 1 tablet by mouth daily, Historical Med      aspirin 81 MG tablet Take 1 tablet by mouth daily, Historical Med      Cholecalciferol (VITAMIN D-3) 1000 units CAPS Take by mouth daily, Historical Med      esomeprazole (NEXIUM) 40 MG capsule Take by mouth, Starting Mon 3/26/2012, Historical Med      Milk Thistle 175 MG CAPS Take by mouth, Historical Med      Multiple Vitamins-Minerals (PX MENS MULTIVITAMINS) TABS Take by mouth daily, Starting Tue 3/27/2012, Historical Med             No discharge procedures on file.     PDMP Review       None            ED Provider  Electronically Signed by             Sherwin Sellers DO  12/07/23 9161